# Patient Record
Sex: MALE | Race: WHITE | NOT HISPANIC OR LATINO | URBAN - NONMETROPOLITAN AREA
[De-identification: names, ages, dates, MRNs, and addresses within clinical notes are randomized per-mention and may not be internally consistent; named-entity substitution may affect disease eponyms.]

---

## 2025-05-06 ENCOUNTER — OFFICE VISIT (OUTPATIENT)
Dept: URGENT CARE | Age: 55
End: 2025-05-06
Payer: COMMERCIAL

## 2025-05-06 VITALS
SYSTOLIC BLOOD PRESSURE: 120 MMHG | TEMPERATURE: 97.4 F | WEIGHT: 235 LBS | BODY MASS INDEX: 28.62 KG/M2 | OXYGEN SATURATION: 96 % | DIASTOLIC BLOOD PRESSURE: 75 MMHG | HEART RATE: 64 BPM | HEIGHT: 76 IN | RESPIRATION RATE: 16 BRPM

## 2025-05-06 DIAGNOSIS — H69.93 EUSTACHIAN TUBE DYSFUNCTION, BILATERAL: ICD-10-CM

## 2025-05-06 DIAGNOSIS — H61.21 IMPACTED CERUMEN OF RIGHT EAR: Primary | ICD-10-CM

## 2025-05-06 PROCEDURE — 69209 REMOVE IMPACTED EAR WAX UNI: CPT

## 2025-05-06 PROCEDURE — 99203 OFFICE O/P NEW LOW 30 MIN: CPT

## 2025-05-06 PROCEDURE — 3008F BODY MASS INDEX DOCD: CPT

## 2025-05-06 PROCEDURE — 1036F TOBACCO NON-USER: CPT

## 2025-05-06 NOTE — LETTER
May 6, 2025     Patient: Ananda Chung   YOB: 1970   Date of Visit: 5/6/2025       To Whom It May Concern:    Ananda Chung was seen in my clinic on 5/6/2025 at 9:05 am. Please excuse Ananda for his absence from work on this day to make the appointment.    If you have any questions or concerns, please don't hesitate to call.         Sincerely,         Bonita Ovalle PA-C        CC: No Recipients

## 2025-05-06 NOTE — PATIENT INSTRUCTIONS
Maintain adequate hydration and nutrition.  Can try Flonase and Zyrtec following dosing instructions.  If symptoms worsen, do not improve, or any other concerning or worrisome symptoms develop please return to the clinic or go to the emergency department.  Follow-up with PCP in 1 to 2 weeks.

## 2025-05-06 NOTE — PROGRESS NOTES
"Subjective   Patient ID: Ananda Chung is a 55 y.o. male. They present today with a chief complaint of Earache (Bilateral ear pain x1.5 week).    History of Present Illness  55-year-old male presents urgent care for bilateral ear fullness comes and goes for past 1 and half weeks.  States left ear really started over the past day.  Denies any current fevers or chills, nausea or sweats, sore throat, sinus pressure pain, sinus congestion runny nose or postnasal drip, neck pain, cough.  States may have seasonal allergies.  Has cerumen impaction right ear.  Denies history of TM rupture or history of diabetes.  Irrigated without difficulty.  Left ear exam normal.  Right ear exam after cerumen removal is normal.  Educated on supportive care.  Follow-up PCP 1 to 2 weeks.  Return/ER precautions.  Patient agrees with plan.          Past Medical History  Allergies as of 05/06/2025    (No Known Allergies)       Prescriptions Prior to Admission[1]     Medical History[2]    Surgical History[3]     reports that he has quit smoking. His smoking use included cigarettes. He has never used smokeless tobacco. He reports that he does not currently use alcohol. He reports that he does not use drugs.    Review of Systems  Review of Systems   All other systems reviewed and are negative.                                 Objective    Vitals:    05/06/25 0902   BP: 120/75   BP Location: Left arm   Patient Position: Sitting   BP Cuff Size: Adult   Pulse: 64   Resp: 16   Temp: 36.3 °C (97.4 °F)   TempSrc: Oral   SpO2: 96%   Weight: 107 kg (235 lb)   Height: 1.93 m (6' 4\")     No LMP for male patient.    Physical Exam  Vitals reviewed.   Constitutional:       General: He is not in acute distress.     Appearance: Normal appearance. He is not ill-appearing, toxic-appearing or diaphoretic.   HENT:      Head: Normocephalic and atraumatic.      Right Ear: There is impacted cerumen.      Left Ear: Tympanic membrane, ear canal and external ear normal. "      Ears:      Comments: Right ear exam normal after cerumen removal.     Nose: Nose normal.      Mouth/Throat:      Mouth: Mucous membranes are moist.      Pharynx: Oropharynx is clear.      Comments: Pharynx unremarkable, airway clear.  Pulmonary:      Effort: Pulmonary effort is normal. No respiratory distress.   Musculoskeletal:      Cervical back: Normal range of motion and neck supple. No rigidity or tenderness.   Lymphadenopathy:      Cervical: No cervical adenopathy.   Skin:     General: Skin is warm and dry.   Neurological:      General: No focal deficit present.      Mental Status: He is alert and oriented to person, place, and time.   Psychiatric:         Mood and Affect: Mood normal.         Behavior: Behavior normal.         Ear Cerumen Removal    Date/Time: 5/6/2025 9:14 AM    Performed by: Bonita Ovalle PA-C  Authorized by: Bonita Ovalle PA-C    Consent:     Consent obtained:  Verbal    Consent given by:  Patient    Risks, benefits, and alternatives were discussed: yes      Risks discussed:  Bleeding, infection, pain, dizziness, incomplete removal and TM perforation    Alternatives discussed:  No treatment and referral  Universal protocol:     Procedure explained and questions answered to patient or proxy's satisfaction: yes      Patient identity confirmed:  Verbally with patient  Procedure details:     Location:  R ear    Procedure type: irrigation      Procedure outcomes: cerumen removed    Post-procedure details:     Inspection:  Ear canal clear, no bleeding and TM intact    Hearing quality:  Normal    Procedure completion:  Tolerated well, no immediate complications      Point of Care Test & Imaging Results from this visit  No results found for this visit on 05/06/25.   Imaging  No results found.    Cardiology, Vascular, and Other Imaging  No other imaging results found for the past 2 days      Diagnostic study results (if any) were reviewed by Bonita Ovalle  KAYY.    Assessment/Plan   Allergies, medications, history, and pertinent labs/EKGs/Imaging reviewed by Bonita Ovalle PA-C.     Medical Decision Making  55-year-old male presents urgent care for bilateral ear fullness comes and goes for past 1 and half weeks.  States left ear really started over the past day.  Denies any current fevers or chills, nausea or sweats, sore throat, sinus pressure pain, sinus congestion runny nose or postnasal drip, neck pain, cough.  States may have seasonal allergies.  Has cerumen impaction right ear.  Denies history of TM rupture or history of diabetes.  Irrigated without difficulty.  Left ear exam normal.  Right ear exam after cerumen removal is normal.  Educated on supportive care.  Follow-up PCP 1 to 2 weeks.  Return/ER precautions.  Patient agrees with plan.    Orders and Diagnoses  There are no diagnoses linked to this encounter.    Medical Admin Record      Patient disposition: Home    Electronically signed by Bonita Ovalle PA-C  9:07 AM           [1] (Not in a hospital admission)  [2] No past medical history on file.  [3] No past surgical history on file.